# Patient Record
Sex: MALE | Race: OTHER | Employment: OTHER | URBAN - METROPOLITAN AREA
[De-identification: names, ages, dates, MRNs, and addresses within clinical notes are randomized per-mention and may not be internally consistent; named-entity substitution may affect disease eponyms.]

---

## 2017-01-21 ENCOUNTER — OFFICE VISIT (OUTPATIENT)
Dept: FAMILY MEDICINE CLINIC | Facility: CLINIC | Age: 42
End: 2017-01-21

## 2017-01-21 VITALS
WEIGHT: 237 LBS | DIASTOLIC BLOOD PRESSURE: 72 MMHG | TEMPERATURE: 98 F | SYSTOLIC BLOOD PRESSURE: 118 MMHG | HEIGHT: 71 IN | RESPIRATION RATE: 16 BRPM | BODY MASS INDEX: 33.18 KG/M2 | HEART RATE: 76 BPM

## 2017-01-21 DIAGNOSIS — Z00.8 TESTICULAR EXAM: ICD-10-CM

## 2017-01-21 DIAGNOSIS — R42 VERTIGO: ICD-10-CM

## 2017-01-21 DIAGNOSIS — Z71.3 DIETARY COUNSELING: ICD-10-CM

## 2017-01-21 DIAGNOSIS — Z13.31 DEPRESSION SCREEN: ICD-10-CM

## 2017-01-21 DIAGNOSIS — Z00.00 ROUTINE GENERAL MEDICAL EXAMINATION AT A HEALTH CARE FACILITY: Primary | ICD-10-CM

## 2017-01-21 DIAGNOSIS — Z71.82 EXERCISE COUNSELING: ICD-10-CM

## 2017-01-21 LAB
APPEARANCE: CLEAR
MULTISTIX LOT#: NORMAL NUMERIC
PH, URINE: 5 (ref 4.5–8)
SPECIFIC GRAVITY: 1.03 (ref 1–1.03)
URINE-COLOR: YELLOW
UROBILINOGEN,SEMI-QN: 0.2 MG/DL (ref 0–1.9)

## 2017-01-21 PROCEDURE — 99396 PREV VISIT EST AGE 40-64: CPT | Performed by: FAMILY MEDICINE

## 2017-01-21 NOTE — PROGRESS NOTES
Amelia Dickson. is a 37year old male who presents for a complete physical exam.   HPI:   Pt here for his annual CPE and testicular exam. He does not wish to have a prostate exam and has no rectal complaints.     He wanted me to know he had right knee pain Rfl:       Past Medical History   Diagnosis Date   • Hx of migraines    • Migraines    • Unspecified essential hypertension       No past surgical history on file.    Family History   Problem Relation Age of Onset   • Cancer Father 54     lung cancer-smoker BS's,no masses, HSM or tenderness  : two descended testes,no masses,no hernia,no penile lesions  RECTAL: deferred   MUSCULOSKELETAL: back is not tender,FROM of the back  EXTREMITIES: no cyanosis, clubbing or edema  NEURO: Oriented times three,cranial ner

## 2018-09-21 ENCOUNTER — OFFICE VISIT (OUTPATIENT)
Dept: FAMILY MEDICINE CLINIC | Facility: CLINIC | Age: 43
End: 2018-09-21
Payer: COMMERCIAL

## 2018-09-21 VITALS
DIASTOLIC BLOOD PRESSURE: 80 MMHG | HEART RATE: 72 BPM | TEMPERATURE: 98 F | RESPIRATION RATE: 16 BRPM | HEIGHT: 71 IN | SYSTOLIC BLOOD PRESSURE: 134 MMHG | BODY MASS INDEX: 31.92 KG/M2 | WEIGHT: 228 LBS

## 2018-09-21 DIAGNOSIS — M25.521 PAIN OF BOTH ELBOWS: ICD-10-CM

## 2018-09-21 DIAGNOSIS — Z00.00 LABORATORY EXAM ORDERED AS PART OF ROUTINE GENERAL MEDICAL EXAMINATION: ICD-10-CM

## 2018-09-21 DIAGNOSIS — Z23 NEED FOR INFLUENZA VACCINATION: ICD-10-CM

## 2018-09-21 DIAGNOSIS — Z00.00 ROUTINE MEDICAL EXAM: Primary | ICD-10-CM

## 2018-09-21 DIAGNOSIS — M25.522 PAIN OF BOTH ELBOWS: ICD-10-CM

## 2018-09-21 LAB
ALBUMIN/GLOBULIN RATIO: 1.7 (CALC) (ref 1–2.5)
ALBUMIN: 4.9 G/DL (ref 3.6–5.1)
ALKALINE PHOSPHATASE: 60 U/L (ref 40–115)
ALT: 70 U/L (ref 9–46)
AST: 44 U/L (ref 10–40)
BILIRUBIN, TOTAL: 0.9 MG/DL (ref 0.2–1.2)
BUN: 14 MG/DL (ref 7–25)
CALCIUM: 9.7 MG/DL (ref 8.6–10.3)
CARBON DIOXIDE: 29 MMOL/L (ref 20–32)
CHLORIDE: 101 MMOL/L (ref 98–110)
CHOL/HDLC RATIO: 5.1 (CALC)
CHOLESTEROL, TOTAL: 226 MG/DL
CREATININE: 0.98 MG/DL (ref 0.6–1.35)
EGFR IF AFRICN AM: 111 ML/MIN/1.73M2
EGFR IF NONAFRICN AM: 95 ML/MIN/1.73M2
GLOBULIN: 2.9 G/DL (CALC) (ref 1.9–3.7)
GLUCOSE: 100 MG/DL (ref 65–99)
HDL CHOLESTEROL: 44 MG/DL
LDL-CHOLESTEROL: 158 MG/DL (CALC)
NON-HDL CHOLESTEROL: 182 MG/DL (CALC)
POTASSIUM: 4 MMOL/L (ref 3.5–5.3)
PROTEIN, TOTAL: 7.8 G/DL (ref 6.1–8.1)
SODIUM: 138 MMOL/L (ref 135–146)
TRIGLYCERIDES: 120 MG/DL

## 2018-09-21 PROCEDURE — 99396 PREV VISIT EST AGE 40-64: CPT | Performed by: FAMILY MEDICINE

## 2018-09-21 PROCEDURE — 99213 OFFICE O/P EST LOW 20 MIN: CPT | Performed by: FAMILY MEDICINE

## 2018-09-21 PROCEDURE — 90471 IMMUNIZATION ADMIN: CPT | Performed by: FAMILY MEDICINE

## 2018-09-21 PROCEDURE — 90686 IIV4 VACC NO PRSV 0.5 ML IM: CPT | Performed by: FAMILY MEDICINE

## 2018-09-21 NOTE — PATIENT INSTRUCTIONS
Prevention Guidelines, Men Ages 36 to 52  Screening tests and vaccines are an important part of managing your health. A screening test is done to find possible disorders or diseases in people who don't have any symptoms.  The goal is to find a disease ear Syphilis Men at increased risk for infection – talk with your healthcare provider At routine exams   Tuberculosis Men at increased risk for infection – talk with your healthcare provider Check with your healthcare provider   Vision All men in this age [de-identified] Diet and exercise Men who are overweight or obese When diagnosed, and then at routine exams   Sexually transmitted infection prevention Men at increased risk for infection – talk with your healthcare provider At routine exams   Use of daily aspirin Men age

## 2018-09-21 NOTE — PROGRESS NOTES
Patient presents with:  Physical: Fasting for labs  Imm/Inj: Request Flu vaccine today. Elbow Pain: C/O bilateral elbow pain R>L. States 4-5 months ago he carried 2 boxes of tile up the stairs & pain since.  Rates pain 0/10 when sitting doing nothing, but Substance and Sexual Activity      Alcohol use: No        Comment: never a problem      Drug use: No      Sexual activity: Yes        Partners: Female    Other Topics      Concerns:         Service: Not Asked        Blood Transfusions: Not Asked nontender, nondistended. No hepatomegaly. MUSCULOSKELETAL: Strength of upper and lower extremities 5/5 bilaterally. Normal gait. Pain of the antecubital dottie with palpation, no tenderness of the olecranon process or lateral/medial epicondyles.   NEUROLOGIC

## 2018-09-24 PROBLEM — E78.2 MIXED HYPERLIPIDEMIA: Status: ACTIVE | Noted: 2018-01-01

## 2019-05-16 NOTE — PROGRESS NOTES
[facility] Group Visit Note  5/16/2019      Subjective:      Patient ID: Rico Cabrera. is a 44year old male.     Chief Complaint:  Patient presents with:  Bump: left chest      HPI:Finn Sheridan. is a 44year old male who is being seen today for t

## 2019-05-17 ENCOUNTER — OFFICE VISIT (OUTPATIENT)
Dept: FAMILY MEDICINE CLINIC | Facility: CLINIC | Age: 44
End: 2019-05-17
Payer: COMMERCIAL

## 2019-05-17 VITALS
TEMPERATURE: 98 F | DIASTOLIC BLOOD PRESSURE: 82 MMHG | RESPIRATION RATE: 16 BRPM | BODY MASS INDEX: 32.62 KG/M2 | HEIGHT: 71 IN | HEART RATE: 74 BPM | WEIGHT: 233 LBS | SYSTOLIC BLOOD PRESSURE: 122 MMHG

## 2019-05-17 DIAGNOSIS — D17.1 LIPOMA OF TORSO: Primary | ICD-10-CM

## 2019-05-17 DIAGNOSIS — E78.2 MIXED HYPERLIPIDEMIA: ICD-10-CM

## 2019-05-17 DIAGNOSIS — Z00.00 LABORATORY EXAM ORDERED AS PART OF ROUTINE GENERAL MEDICAL EXAMINATION: ICD-10-CM

## 2019-05-17 PROCEDURE — 99213 OFFICE O/P EST LOW 20 MIN: CPT | Performed by: FAMILY MEDICINE

## 2019-05-17 NOTE — PATIENT INSTRUCTIONS
Lipoma, No Treatment  A lipoma is a local overgrowth of fatty tissue. It appears as a soft raised area, usually less than 2 inches across. It is a benign condition (not cancer).   Home care  General information regarding lipoma includes:  1.  No special c

## 2019-11-22 ENCOUNTER — OFFICE VISIT (OUTPATIENT)
Dept: FAMILY MEDICINE CLINIC | Facility: CLINIC | Age: 44
End: 2019-11-22
Payer: COMMERCIAL

## 2019-11-22 VITALS
RESPIRATION RATE: 16 BRPM | SYSTOLIC BLOOD PRESSURE: 126 MMHG | BODY MASS INDEX: 34.02 KG/M2 | HEIGHT: 71 IN | HEART RATE: 84 BPM | DIASTOLIC BLOOD PRESSURE: 82 MMHG | WEIGHT: 243 LBS | TEMPERATURE: 98 F

## 2019-11-22 DIAGNOSIS — R19.07 GENERALIZED SWELLING, MASS, OR LUMP OF ABDOMEN OR PELVIS: Primary | ICD-10-CM

## 2019-11-22 PROCEDURE — 99213 OFFICE O/P EST LOW 20 MIN: CPT | Performed by: FAMILY MEDICINE

## 2019-11-22 NOTE — PROGRESS NOTES
[facility] Group Visit Note  11/22/2019      Subjective:      Patient ID: Noam Bunn. is a 38year old male. Chief Complaint:  Patient presents with: Follow - Up: Here to discuss getting a referral for the Lipoma on the left torso.  States for

## 2019-12-02 ENCOUNTER — TELEPHONE (OUTPATIENT)
Dept: FAMILY MEDICINE CLINIC | Facility: CLINIC | Age: 44
End: 2019-12-02

## 2019-12-02 NOTE — TELEPHONE ENCOUNTER
Patient states he went to [facility] to have his CT and was turned away without an order. He states he tried contacting the doctor, [employer], the office, the answering service to try to obtain it.  He states he did not drink the contrast an

## 2019-12-09 ENCOUNTER — TELEPHONE (OUTPATIENT)
Dept: FAMILY MEDICINE CLINIC | Facility: CLINIC | Age: 44
End: 2019-12-09

## 2019-12-09 DIAGNOSIS — Q67.8 CHEST WALL ASYMMETRY: Primary | ICD-10-CM

## 2019-12-09 NOTE — TELEPHONE ENCOUNTER
CT Abdomen with contract done on 12/6/19 @ [facility] results/report received via fax. Report placed in Dr. Josemanuel Galaviz in box for review.

## 2019-12-11 NOTE — TELEPHONE ENCOUNTER
Please inform pt his CT abd showed some incidental findings:  -fatty liver. Tx is diet/exercise.   -gall stones, which would cause pain of the RUQ after eating fatty foods, which is not what he was c/o.     But overall no finding of the abdominal wall over

## 2019-12-11 NOTE — TELEPHONE ENCOUNTER
[facility] requesting a return all. Sent report to scan, copy in Triage file.

## 2019-12-12 NOTE — TELEPHONE ENCOUNTER
Pt informed of both test results and recommendations, answered questions and he expressed understanding and agreement. He will call to schedule the Xray. Task completed.

## 2020-05-22 ENCOUNTER — APPOINTMENT (OUTPATIENT)
Dept: CT IMAGING | Facility: HOSPITAL | Age: 45
End: 2020-05-22
Attending: PHYSICIAN ASSISTANT
Payer: COMMERCIAL

## 2020-05-22 ENCOUNTER — HOSPITAL ENCOUNTER (EMERGENCY)
Facility: HOSPITAL | Age: 45
Discharge: HOME | End: 2020-05-22
Attending: EMERGENCY MEDICINE
Payer: COMMERCIAL

## 2020-05-22 ENCOUNTER — TELEPHONE (OUTPATIENT)
Dept: FAMILY MEDICINE CLINIC | Facility: CLINIC | Age: 45
End: 2020-05-22

## 2020-05-22 ENCOUNTER — VIRTUAL PHONE E/M (OUTPATIENT)
Dept: FAMILY MEDICINE CLINIC | Facility: CLINIC | Age: 45
End: 2020-05-22
Payer: COMMERCIAL

## 2020-05-22 VITALS
BODY MASS INDEX: 33 KG/M2 | OXYGEN SATURATION: 99 % | TEMPERATURE: 97 F | DIASTOLIC BLOOD PRESSURE: 90 MMHG | SYSTOLIC BLOOD PRESSURE: 169 MMHG | HEART RATE: 83 BPM | WEIGHT: 240 LBS | RESPIRATION RATE: 24 BRPM

## 2020-05-22 DIAGNOSIS — N13.2 HYDRONEPHROSIS CONCURRENT WITH AND DUE TO CALCULI OF KIDNEY AND URETER: ICD-10-CM

## 2020-05-22 DIAGNOSIS — R73.9 HYPERGLYCEMIA: Primary | ICD-10-CM

## 2020-05-22 DIAGNOSIS — R11.0 NAUSEA: ICD-10-CM

## 2020-05-22 DIAGNOSIS — R10.31 RLQ ABDOMINAL PAIN: Primary | ICD-10-CM

## 2020-05-22 PROCEDURE — 80053 COMPREHEN METABOLIC PANEL: CPT | Performed by: PHYSICIAN ASSISTANT

## 2020-05-22 PROCEDURE — 96376 TX/PRO/DX INJ SAME DRUG ADON: CPT

## 2020-05-22 PROCEDURE — 99284 EMERGENCY DEPT VISIT MOD MDM: CPT

## 2020-05-22 PROCEDURE — 85025 COMPLETE CBC W/AUTO DIFF WBC: CPT | Performed by: PHYSICIAN ASSISTANT

## 2020-05-22 PROCEDURE — 81001 URINALYSIS AUTO W/SCOPE: CPT | Performed by: EMERGENCY MEDICINE

## 2020-05-22 PROCEDURE — 99441 PHONE E/M BY PHYS 5-10 MIN: CPT | Performed by: FAMILY MEDICINE

## 2020-05-22 PROCEDURE — 96361 HYDRATE IV INFUSION ADD-ON: CPT

## 2020-05-22 PROCEDURE — 74176 CT ABD & PELVIS W/O CONTRAST: CPT | Performed by: PHYSICIAN ASSISTANT

## 2020-05-22 PROCEDURE — 96374 THER/PROPH/DIAG INJ IV PUSH: CPT

## 2020-05-22 PROCEDURE — 96375 TX/PRO/DX INJ NEW DRUG ADDON: CPT

## 2020-05-22 RX ORDER — HYDROMORPHONE HYDROCHLORIDE 1 MG/ML
0.5 INJECTION, SOLUTION INTRAMUSCULAR; INTRAVENOUS; SUBCUTANEOUS ONCE
Status: COMPLETED | OUTPATIENT
Start: 2020-05-22 | End: 2020-05-22

## 2020-05-22 RX ORDER — TAMSULOSIN HYDROCHLORIDE 0.4 MG/1
0.4 CAPSULE ORAL DAILY
Qty: 7 CAPSULE | Refills: 0 | Status: SHIPPED | OUTPATIENT
Start: 2020-05-22 | End: 2020-05-29

## 2020-05-22 RX ORDER — HYDROCODONE BITARTRATE AND ACETAMINOPHEN 5; 325 MG/1; MG/1
1-2 TABLET ORAL EVERY 6 HOURS PRN
Qty: 10 TABLET | Refills: 0 | Status: SHIPPED | OUTPATIENT
Start: 2020-05-22 | End: 2020-05-29

## 2020-05-22 RX ORDER — TAMSULOSIN HYDROCHLORIDE 0.4 MG/1
0.4 CAPSULE ORAL DAILY
Qty: 7 CAPSULE | Refills: 0 | Status: SHIPPED | OUTPATIENT
Start: 2020-05-22 | End: 2020-05-22

## 2020-05-22 RX ORDER — KETOROLAC TROMETHAMINE 30 MG/ML
15 INJECTION, SOLUTION INTRAMUSCULAR; INTRAVENOUS ONCE
Status: COMPLETED | OUTPATIENT
Start: 2020-05-22 | End: 2020-05-22

## 2020-05-22 RX ORDER — HYDROMORPHONE HYDROCHLORIDE 1 MG/ML
1 INJECTION, SOLUTION INTRAMUSCULAR; INTRAVENOUS; SUBCUTANEOUS ONCE
Status: COMPLETED | OUTPATIENT
Start: 2020-05-22 | End: 2020-05-22

## 2020-05-22 NOTE — TELEPHONE ENCOUNTER
Please call [phone]    Virtual/Telephone Consent    Colleen Sears. verbally consents to a Virtual/Telephone Check-In service on 5/22/20.   Patient understands and accepts financial responsibility for any deductible, co-insurance and/or co-pays associ

## 2020-05-22 NOTE — ED PROVIDER NOTES
Patient Seen in: [facility] Emergency Department      History   Patient presents with:  Abdomen/Flank Pain    Stated Complaint: abd pain    HPI    21-year-old male here with complaint of right flank pain after he had a BM this morning.   Patient repo Mouth: Mucous membranes are moist.   Eyes:      Extraocular Movements: Extraocular movements intact. Conjunctiva/sclera: Conjunctivae normal.      Pupils: Pupils are equal, round, and reactive to light.    Neck:      Musculoskeletal: Normal range Please view results for these tests on the individual orders.    RAINBOW DRAW BLUE   RAINBOW DRAW LAVENDER   RAINBOW DRAW LIGHT GREEN   RAINBOW DRAW GOLD   CBC W/ DIFFERENTIAL   Ct Abdomen+pelvis Kidneystone 2d Rndr(no Iv,no Oral)(cpt=74176)    Result D the bowel is decompressed, limiting assessment. There is a normal-appearing appendix. ABDOMINAL WALL:  No mass or hernia. BONES:  No bony lesion or fracture. PELVIC ORGANS:  Normal for age. LUNG BASES:  No visible pulmonary or pleural disease.   OTHER: Medication List    START taking these medications    HYDROcodone-acetaminophen 5-325 MG Oral Tab  Take 1-2 tablets by mouth every 6 (six) hours as needed for Pain.   Qty: 10 tablet Refills: 0    tamsulosin (FLOMAX) cap  Take 1 capsule (0.4 mg total) by mout

## 2020-05-22 NOTE — PROGRESS NOTES
Virtual Telephone Check-In    [address] [place] [address]. erbally consents to a Virtual/Telephone Check-In visit on  05/22/20. Patient understands and accepts financial responsibility for any deductible, co-insurance and/or co-pays associated with this service.

## 2022-10-14 ENCOUNTER — OFFICE VISIT (OUTPATIENT)
Dept: FAMILY MEDICINE CLINIC | Facility: CLINIC | Age: 47
End: 2022-10-14
Payer: COMMERCIAL

## 2022-10-14 VITALS
RESPIRATION RATE: 16 BRPM | SYSTOLIC BLOOD PRESSURE: 180 MMHG | OXYGEN SATURATION: 99 % | TEMPERATURE: 98 F | HEART RATE: 96 BPM | DIASTOLIC BLOOD PRESSURE: 100 MMHG | BODY MASS INDEX: 33.32 KG/M2 | WEIGHT: 238 LBS | HEIGHT: 71 IN

## 2022-10-14 DIAGNOSIS — I10 ESSENTIAL HYPERTENSION: Primary | ICD-10-CM

## 2022-10-14 DIAGNOSIS — G43.909 MIGRAINE WITHOUT STATUS MIGRAINOSUS, NOT INTRACTABLE, UNSPECIFIED MIGRAINE TYPE: ICD-10-CM

## 2022-10-14 DIAGNOSIS — E78.2 MIXED HYPERLIPIDEMIA: ICD-10-CM

## 2022-10-14 DIAGNOSIS — R73.09 ELEVATED GLUCOSE: ICD-10-CM

## 2022-10-14 PROCEDURE — 3008F BODY MASS INDEX DOCD: CPT | Performed by: FAMILY MEDICINE

## 2022-10-14 PROCEDURE — 3080F DIAST BP >= 90 MM HG: CPT | Performed by: FAMILY MEDICINE

## 2022-10-14 PROCEDURE — 99214 OFFICE O/P EST MOD 30 MIN: CPT | Performed by: FAMILY MEDICINE

## 2022-10-14 PROCEDURE — 3077F SYST BP >= 140 MM HG: CPT | Performed by: FAMILY MEDICINE

## 2022-10-14 RX ORDER — METOPROLOL SUCCINATE 25 MG/1
25 TABLET, EXTENDED RELEASE ORAL DAILY
Qty: 30 TABLET | Refills: 0 | Status: SHIPPED | OUTPATIENT
Start: 2022-10-14

## 2022-10-15 LAB
ABSOLUTE BASOPHILS: 39 CELLS/UL (ref 0–200)
ABSOLUTE EOSINOPHILS: 101 CELLS/UL (ref 15–500)
ABSOLUTE LYMPHOCYTES: 2278 CELLS/UL (ref 850–3900)
ABSOLUTE MONOCYTES: 530 CELLS/UL (ref 200–950)
ABSOLUTE NEUTROPHILS: 4852 CELLS/UL (ref 1500–7800)
ALBUMIN/GLOBULIN RATIO: 1.5 (CALC) (ref 1–2.5)
ALBUMIN: 5.1 G/DL (ref 3.6–5.1)
ALKALINE PHOSPHATASE: 78 U/L (ref 36–130)
ALT: 62 U/L (ref 9–46)
AST: 48 U/L (ref 10–40)
BASOPHILS: 0.5 %
BILIRUBIN, TOTAL: 0.6 MG/DL (ref 0.2–1.2)
BUN: 13 MG/DL (ref 7–25)
CALCIUM: 10.4 MG/DL (ref 8.6–10.3)
CARBON DIOXIDE: 30 MMOL/L (ref 20–32)
CHLORIDE: 98 MMOL/L (ref 98–110)
CHOL/HDLC RATIO: 5.6 (CALC)
CHOLESTEROL, TOTAL: 247 MG/DL
CREATININE: 0.84 MG/DL (ref 0.6–1.29)
EGFR: 110 ML/MIN/1.73M2
EOSINOPHILS: 1.3 %
GLOBULIN: 3.3 G/DL (CALC) (ref 1.9–3.7)
GLUCOSE: 222 MG/DL (ref 65–99)
HDL CHOLESTEROL: 44 MG/DL
HEMATOCRIT: 46.7 % (ref 38.5–50)
HEMOGLOBIN A1C: 9.5 % OF TOTAL HGB
HEMOGLOBIN: 16.2 G/DL (ref 13.2–17.1)
LYMPHOCYTES: 29.2 %
MCH: 31.6 PG (ref 27–33)
MCHC: 34.7 G/DL (ref 32–36)
MCV: 91.2 FL (ref 80–100)
MONOCYTES: 6.8 %
MPV: 11.2 FL (ref 7.5–12.5)
NEUTROPHILS: 62.2 %
NON-HDL CHOLESTEROL: 203 MG/DL (CALC)
PLATELET COUNT: 214 THOUSAND/UL (ref 140–400)
POTASSIUM: 4.3 MMOL/L (ref 3.5–5.3)
PROTEIN, TOTAL: 8.4 G/DL (ref 6.1–8.1)
RDW: 12.2 % (ref 11–15)
RED BLOOD CELL COUNT: 5.12 MILLION/UL (ref 4.2–5.8)
SODIUM: 137 MMOL/L (ref 135–146)
TRIGLYCERIDES: 499 MG/DL
TSH: 1.89 MIU/L (ref 0.4–4.5)
WHITE BLOOD CELL COUNT: 7.8 THOUSAND/UL (ref 3.8–10.8)

## 2022-10-16 ENCOUNTER — TELEPHONE (OUTPATIENT)
Dept: FAMILY MEDICINE CLINIC | Facility: CLINIC | Age: 47
End: 2022-10-16

## 2022-10-17 NOTE — TELEPHONE ENCOUNTER
On-call: BP still high and wondering if needs to go to ER. Has taken 2 doses of metoprolol tartrate 25mg and not much difference, Had a headache yesterday, but not today. Feels fine. 175/107, 180/100, 173/94    Denies- chest pain, sob, nosebleeds, headache      Rec- increase metoprolol succinate 25mg up to 2# nightly. Take it at [facility], [facility]. If not reducing to <160/<100 to call the office on Mon or Tues and will further adjust. Has an appt for this Friday. If any concerning sxs occur to call the office/go to ER.  Pt agrees to plan

## 2022-10-21 ENCOUNTER — TELEMEDICINE (OUTPATIENT)
Dept: FAMILY MEDICINE CLINIC | Facility: CLINIC | Age: 47
End: 2022-10-21

## 2022-10-21 DIAGNOSIS — R73.09 ELEVATED GLUCOSE: Primary | ICD-10-CM

## 2022-10-21 DIAGNOSIS — I10 ESSENTIAL HYPERTENSION: ICD-10-CM

## 2022-10-21 DIAGNOSIS — R74.8 ELEVATED LIVER ENZYMES: ICD-10-CM

## 2022-10-21 PROCEDURE — 99214 OFFICE O/P EST MOD 30 MIN: CPT | Performed by: FAMILY MEDICINE

## 2022-10-21 RX ORDER — METOPROLOL SUCCINATE 50 MG/1
50 TABLET, EXTENDED RELEASE ORAL DAILY
Qty: 90 TABLET | Refills: 0 | Status: SHIPPED | OUTPATIENT
Start: 2022-10-21

## 2022-10-31 ENCOUNTER — TELEPHONE (OUTPATIENT)
Dept: FAMILY MEDICINE CLINIC | Facility: CLINIC | Age: 47
End: 2022-10-31

## 2022-10-31 ENCOUNTER — PATIENT MESSAGE (OUTPATIENT)
Dept: FAMILY MEDICINE CLINIC | Facility: CLINIC | Age: 47
End: 2022-10-31

## 2022-10-31 NOTE — TELEPHONE ENCOUNTER
From: [employer]. To: Jamia Finley MD  Sent: 10/31/2022 8:53 AM CDT  Subject: Medication question     Hello I was wondering if at all possible can my blood pressure medication be lowered to 25 mg as opposed to the 50 mg. At 50 mg I have noticed increase dizziness.

## 2023-01-03 DIAGNOSIS — I10 ESSENTIAL HYPERTENSION: ICD-10-CM

## 2023-01-04 RX ORDER — METOPROLOL SUCCINATE 50 MG/1
50 TABLET, EXTENDED RELEASE ORAL DAILY
Qty: 90 TABLET | Refills: 0 | Status: SHIPPED | OUTPATIENT
Start: 2023-01-04

## 2023-01-27 ENCOUNTER — OFFICE VISIT (OUTPATIENT)
Dept: FAMILY MEDICINE CLINIC | Facility: CLINIC | Age: 48
End: 2023-01-27
Payer: COMMERCIAL

## 2023-01-27 ENCOUNTER — TELEPHONE (OUTPATIENT)
Dept: FAMILY MEDICINE CLINIC | Facility: CLINIC | Age: 48
End: 2023-01-27

## 2023-01-27 VITALS
OXYGEN SATURATION: 99 % | TEMPERATURE: 97 F | HEART RATE: 87 BPM | HEIGHT: 71 IN | DIASTOLIC BLOOD PRESSURE: 74 MMHG | WEIGHT: 207.25 LBS | SYSTOLIC BLOOD PRESSURE: 136 MMHG | BODY MASS INDEX: 29.02 KG/M2 | RESPIRATION RATE: 16 BRPM

## 2023-01-27 DIAGNOSIS — Z00.00 ROUTINE MEDICAL EXAM: Primary | ICD-10-CM

## 2023-01-27 DIAGNOSIS — R73.09 ELEVATED GLUCOSE: ICD-10-CM

## 2023-01-27 DIAGNOSIS — Z00.00 LABORATORY EXAM ORDERED AS PART OF ROUTINE GENERAL MEDICAL EXAMINATION: ICD-10-CM

## 2023-01-27 DIAGNOSIS — R74.8 ELEVATED LIVER ENZYMES: ICD-10-CM

## 2023-01-27 DIAGNOSIS — E78.1 HYPERTRIGLYCERIDEMIA: ICD-10-CM

## 2023-01-27 DIAGNOSIS — Z12.5 PROSTATE CANCER SCREENING: ICD-10-CM

## 2023-01-27 DIAGNOSIS — Z12.11 COLON CANCER SCREENING: ICD-10-CM

## 2023-01-27 DIAGNOSIS — Z23 NEED FOR VACCINATION: ICD-10-CM

## 2023-01-27 LAB
CERULOPLASMIN: 33 MG/DL (ref 18–36)
FERRITIN: 64 NG/ML (ref 38–380)
HEMOGLOBIN A1C: 5.7 % OF TOTAL HGB
MITOCHONDRIAL AB SCREEN: NEGATIVE
SIGNAL TO CUT-OFF: 0.07
TISSUE TRANSGLUTAMINASE$ANTIBODY, IGA: <1 U/ML

## 2023-01-27 PROCEDURE — 90472 IMMUNIZATION ADMIN EACH ADD: CPT | Performed by: FAMILY MEDICINE

## 2023-01-27 PROCEDURE — 90714 TD VACC NO PRESV 7 YRS+ IM: CPT | Performed by: FAMILY MEDICINE

## 2023-01-27 PROCEDURE — 3078F DIAST BP <80 MM HG: CPT | Performed by: FAMILY MEDICINE

## 2023-01-27 PROCEDURE — 3008F BODY MASS INDEX DOCD: CPT | Performed by: FAMILY MEDICINE

## 2023-01-27 PROCEDURE — 99396 PREV VISIT EST AGE 40-64: CPT | Performed by: FAMILY MEDICINE

## 2023-01-27 PROCEDURE — 90471 IMMUNIZATION ADMIN: CPT | Performed by: FAMILY MEDICINE

## 2023-01-27 PROCEDURE — 3075F SYST BP GE 130 - 139MM HG: CPT | Performed by: FAMILY MEDICINE

## 2023-01-27 NOTE — TELEPHONE ENCOUNTER
Pt had labs done at [address] [place] [address] on wed 1/25. I placed more labs today, can you call and see if they can add them?

## 2023-02-04 ENCOUNTER — PATIENT MESSAGE (OUTPATIENT)
Dept: FAMILY MEDICINE CLINIC | Facility: CLINIC | Age: 48
End: 2023-02-04

## 2023-02-04 LAB
ACTIN (SMOOTH MUSCLE)$ANTIBODY (IGG): <20 U
ALBUMIN/GLOBULIN RATIO: 1.7 (CALC) (ref 1–2.5)
ALBUMIN: 5.5 G/DL (ref 3.6–5.1)
ALKALINE PHOSPHATASE: 81 U/L (ref 36–130)
ALPHA-1-ANTITRYPSIN QN: 150 MG/DL (ref 83–199)
ALT: 18 U/L (ref 9–46)
ANA SCREEN, IFA: POSITIVE
AST: 17 U/L (ref 10–40)
BILIRUBIN, TOTAL: 0.5 MG/DL (ref 0.2–1.2)
BUN: 20 MG/DL (ref 7–25)
CALCIUM: 11.1 MG/DL (ref 8.6–10.3)
CARBON DIOXIDE: 23 MMOL/L (ref 20–32)
CERULOPLASMIN: 33 MG/DL (ref 18–36)
CHLORIDE: 102 MMOL/L (ref 98–110)
CHOL/HDLC RATIO: 5.2 (CALC)
CHOLESTEROL, TOTAL: 245 MG/DL
CREATININE: 0.96 MG/DL (ref 0.6–1.29)
DNA (DS) ANTIBODY: 1 IU/ML
EGFR: 99 ML/MIN/1.73M2
FERRITIN: 64 NG/ML (ref 38–380)
GLOBULIN: 3.3 G/DL (CALC) (ref 1.9–3.7)
GLUCOSE: 136 MG/DL (ref 65–99)
HDL CHOLESTEROL: 47 MG/DL
HEMOGLOBIN A1C: 5.7 % OF TOTAL HGB
IMMUNOGLOBULIN A: 301 MG/DL (ref 47–310)
LDL-CHOLESTEROL: 168 MG/DL (CALC)
MITOCHONDRIAL AB SCREEN: NEGATIVE
NON-HDL CHOLESTEROL: 198 MG/DL (CALC)
POTASSIUM: 4.1 MMOL/L (ref 3.5–5.3)
PROTEIN, TOTAL: 8.8 G/DL (ref 6.1–8.1)
SIGNAL TO CUT-OFF: 0.07
SODIUM: 140 MMOL/L (ref 135–146)
TISSUE TRANSGLUTAMINASE$ANTIBODY, IGA: <1 U/ML
TRIGLYCERIDES: 152 MG/DL

## 2023-02-10 ENCOUNTER — TELEPHONE (OUTPATIENT)
Dept: FAMILY MEDICINE CLINIC | Facility: CLINIC | Age: 48
End: 2023-02-10

## 2023-02-10 PROBLEM — R76.8 POSITIVE ANA (ANTINUCLEAR ANTIBODY): Status: ACTIVE | Noted: 2023-02-01

## 2023-02-10 NOTE — TELEPHONE ENCOUNTER
Dr.Martens- Smith's wife is calling to request a call when lab results are available.     Heidi 30. [phone]

## 2023-02-10 NOTE — TELEPHONE ENCOUNTER
See phone message below:    Labs added to 1/25/23 [facility] labs are ready for review. Thanks! Detail Level: Zone

## 2023-02-13 DIAGNOSIS — R79.89 ELEVATED LIVER FUNCTION TESTS: Primary | ICD-10-CM

## 2023-02-13 DIAGNOSIS — R76.8 POSITIVE ANA (ANTINUCLEAR ANTIBODY): ICD-10-CM

## 2023-02-24 ENCOUNTER — TELEPHONE (OUTPATIENT)
Dept: ORTHOPEDICS CLINIC | Facility: CLINIC | Age: 48
End: 2023-02-24

## 2023-02-24 DIAGNOSIS — M25.561 RIGHT KNEE PAIN, UNSPECIFIED CHRONICITY: Primary | ICD-10-CM

## 2023-02-24 NOTE — TELEPHONE ENCOUNTER
NP Right Knee Pain. Please advise if imaging is needed.   Future Appointments   Date Time Provider Juliann Domi   3/29/2023  8:40 AM Jeannine Newby MD [facility]   4/20/2023  1:30 PM Luz Maria Vaughn MD [facility] [address]

## 2023-02-24 NOTE — TELEPHONE ENCOUNTER
XR ordered and scheduled per Ortho protocol. Sent patient message via [employer] to inform them and ask them to arrive 15-20 minutes prior to appointment with Shahana Urena.

## 2023-03-22 LAB — AMB EXT COLOGUARD RESULT: NEGATIVE

## 2023-03-30 ENCOUNTER — TELEPHONE (OUTPATIENT)
Dept: FAMILY MEDICINE CLINIC | Facility: CLINIC | Age: 48
End: 2023-03-30

## 2023-03-30 NOTE — TELEPHONE ENCOUNTER
We received Cologuard test results. They are negative. Report has been abstracted. [facility] message sent to patient. HM updated.

## 2023-04-03 ENCOUNTER — PATIENT MESSAGE (OUTPATIENT)
Dept: FAMILY MEDICINE CLINIC | Facility: CLINIC | Age: 48
End: 2023-04-03

## 2023-04-03 DIAGNOSIS — Z01.818 PRE-OPERATIVE EXAMINATION: ICD-10-CM

## 2023-04-03 DIAGNOSIS — S83.241D ACUTE MEDIAL MENISCUS TEAR OF RIGHT KNEE, SUBSEQUENT ENCOUNTER: Primary | ICD-10-CM

## 2023-04-03 NOTE — TELEPHONE ENCOUNTER
From: [employer]. To: Nidia Meckel, MD  Sent: 4/3/2023 10:49 AM CDT  Subject: Pre op test     Hey there I am scheduled to have knee surgery on May 10th and need lab work and orders complete prior  Dr. Oscar Calles will be surgeon and was told they would be sending the orders to Dr Cathy Beckwith. Just wonder if that has happened yet?

## 2023-04-03 NOTE — TELEPHONE ENCOUNTER
preop appt scheduled for 5/2/23  Future Appointments   Date Time Provider Juliann Moyai   4/20/2023  1:30 PM Norma Shaikh MD [facility]   5/2/2023  7:40 AM Fidencio Evans MD [facility] [address]     Were orders received?

## 2023-04-11 ENCOUNTER — TELEPHONE (OUTPATIENT)
Dept: ORTHOPEDICS CLINIC | Facility: CLINIC | Age: 48
End: 2023-04-11

## 2023-04-11 DIAGNOSIS — M79.671 RIGHT FOOT PAIN: Primary | ICD-10-CM

## 2023-04-11 NOTE — TELEPHONE ENCOUNTER
Patient is scheduled with Dr. Rafael Salinas for right foot pain. Please advise if imaging is needed.

## 2023-04-11 NOTE — TELEPHONE ENCOUNTER
Patient aware to arrive early for xray:  Future Appointments   Date Time Provider Juliann Moyai   4/20/2023  1:30 PM Kisha Leblanc MD [facility]   4/25/2023  2:45 PM NAP XR RM1 [facility] XRAY [facility] [place]   4/25/2023  3:00 PM Florencio Rankin DPM [facility]   5/8/2023  4:20 PM Jason Scott MD [facility]

## 2023-04-20 ENCOUNTER — OFFICE VISIT (OUTPATIENT)
Dept: RHEUMATOLOGY | Facility: CLINIC | Age: 48
End: 2023-04-20
Payer: COMMERCIAL

## 2023-04-20 VITALS
BODY MASS INDEX: 29.54 KG/M2 | HEART RATE: 85 BPM | OXYGEN SATURATION: 97 % | TEMPERATURE: 98 F | HEIGHT: 71 IN | SYSTOLIC BLOOD PRESSURE: 134 MMHG | WEIGHT: 211 LBS | DIASTOLIC BLOOD PRESSURE: 76 MMHG

## 2023-04-20 DIAGNOSIS — R76.8 POSITIVE ANA (ANTINUCLEAR ANTIBODY): Primary | ICD-10-CM

## 2023-04-20 DIAGNOSIS — R74.01 ELEVATED ALT MEASUREMENT: ICD-10-CM

## 2023-04-20 DIAGNOSIS — M77.51 BURSITIS OF RIGHT FOOT: ICD-10-CM

## 2023-04-20 DIAGNOSIS — E83.52 HYPERCALCEMIA: ICD-10-CM

## 2023-04-20 DIAGNOSIS — Z51.81 THERAPEUTIC DRUG MONITORING: ICD-10-CM

## 2023-04-20 DIAGNOSIS — K76.0 NAFLD (NONALCOHOLIC FATTY LIVER DISEASE): ICD-10-CM

## 2023-04-20 PROCEDURE — 99204 OFFICE O/P NEW MOD 45 MIN: CPT | Performed by: INTERNAL MEDICINE

## 2023-04-20 PROCEDURE — 3008F BODY MASS INDEX DOCD: CPT | Performed by: INTERNAL MEDICINE

## 2023-04-20 PROCEDURE — 3075F SYST BP GE 130 - 139MM HG: CPT | Performed by: INTERNAL MEDICINE

## 2023-04-20 PROCEDURE — 3078F DIAST BP <80 MM HG: CPT | Performed by: INTERNAL MEDICINE

## 2023-05-08 ENCOUNTER — OFFICE VISIT (OUTPATIENT)
Dept: ORTHOPEDICS CLINIC | Facility: CLINIC | Age: 48
End: 2023-05-08
Payer: COMMERCIAL

## 2023-05-08 ENCOUNTER — TELEPHONE (OUTPATIENT)
Dept: ORTHOPEDICS CLINIC | Facility: CLINIC | Age: 48
End: 2023-05-08

## 2023-05-08 VITALS — OXYGEN SATURATION: 99 % | HEIGHT: 71.5 IN | BODY MASS INDEX: 29.63 KG/M2 | HEART RATE: 72 BPM | WEIGHT: 216.38 LBS

## 2023-05-08 DIAGNOSIS — S83.241A ACUTE MEDIAL MENISCUS TEAR, RIGHT, INITIAL ENCOUNTER: Primary | ICD-10-CM

## 2023-05-08 PROCEDURE — 99203 OFFICE O/P NEW LOW 30 MIN: CPT | Performed by: ORTHOPAEDIC SURGERY

## 2023-05-08 PROCEDURE — 3008F BODY MASS INDEX DOCD: CPT | Performed by: ORTHOPAEDIC SURGERY

## 2023-05-08 NOTE — TELEPHONE ENCOUNTER
Patient calling due to xrays that have been ordered and scheduled. Patients wife is saying he had xray of right knee done 3/2 and would like to request if he is able to bring imaging disk due to not wanting to pay for additional xr. Patient stated he will bring disk either way, please advise if this is ok as patient would like to follow up.

## 2023-05-24 ENCOUNTER — OFFICE VISIT (OUTPATIENT)
Dept: ORTHOPEDICS CLINIC | Facility: CLINIC | Age: 48
End: 2023-05-24
Payer: COMMERCIAL

## 2023-05-24 VITALS — HEIGHT: 72 IN | WEIGHT: 205 LBS | BODY MASS INDEX: 27.77 KG/M2

## 2023-05-24 DIAGNOSIS — S83.231A COMPLEX TEAR OF MEDIAL MENISCUS OF RIGHT KNEE AS CURRENT INJURY, INITIAL ENCOUNTER: Primary | ICD-10-CM

## 2023-05-24 RX ORDER — TRIAMCINOLONE ACETONIDE 40 MG/ML
40 INJECTION, SUSPENSION INTRA-ARTICULAR; INTRAMUSCULAR ONCE
Status: COMPLETED | OUTPATIENT
Start: 2023-05-24 | End: 2023-05-24

## 2023-05-24 RX ORDER — KETOROLAC TROMETHAMINE 30 MG/ML
30 INJECTION, SOLUTION INTRAMUSCULAR; INTRAVENOUS ONCE
Status: COMPLETED | OUTPATIENT
Start: 2023-05-24 | End: 2023-05-24

## 2023-05-24 RX ADMIN — TRIAMCINOLONE ACETONIDE 40 MG: 40 INJECTION, SUSPENSION INTRA-ARTICULAR; INTRAMUSCULAR at 08:01:00

## 2023-05-24 RX ADMIN — KETOROLAC TROMETHAMINE 30 MG: 30 INJECTION, SOLUTION INTRAMUSCULAR; INTRAVENOUS at 08:01:00

## 2023-05-24 NOTE — PROCEDURES
Right Knee Intra-articular Injection    Name: Jake Rocha   MRN: [id number]  Date: 5/24/2023     Clinical Indications:   Meniscus Tear with symptoms refractory to conservative measures. After informed consent, the injection site was marked, sterilized with topical chlorhexidine antiseptic, and locally anesthetized with skin refrigerant. The patient was situation in a comfortable position. Using sterile technique: 1 mL of 30mg/mL of Ketorolac, 2 mL of 0.5% Bupivicaine, 2 mL of 1% Lidocaine, and 1 mL of 40 mg/ml Triamcinolone was injected utilizing anterolateral approach with a 22 gauge needle. A band-aid was applied. The patient tolerated the procedure well. Brown Sood. Kasandra Wood MD  Knee, Shoulder, & Elbow Surgery / Sports Medicine Specialist  THE [facility] Orthopaedic Surgery  [address]   Katerine Anand. [email or web address]  t: [phone]  o: [phone]  f: [phone]

## 2023-09-13 NOTE — PATIENT INSTRUCTIONS
Continue to focus on a healthy diet and exercise.  Fasting labs today.  Continue to follow with GYN.   Finn you were seen today for your annual  physical. Please continue healthy habits with your diet and exercise. Wear sunscreen as needed and insect repellant when needed. Labs are due by November of 2017. Get me copies of your life insurance labs.   See a · Cut the sugar in recipes by 1/3 to 1/2. Boost the flavor with extracts like almond, vanilla, or orange. Or add spices such as cinnamon or nutmeg. 4. Eat more fiber  · Eat fresh fruits and vegetables every day. · Boost your diet with whole grains.  Go fo Prostate cancer Starting at age 39, talk to healthcare provider about risks and benefits of digital rectal exam (PAT) and prostate-specific antigen (PSA) screening1 At routine exams   Syphilis Men at increased risk for infection – talk with your healthcare pertussis (Td/Tdap) booster All men in this age group Td every 10 years, or a one-time dose of Tdap instead of a Td booster after age 25, then Td every 10 years   Counseling Who needs it How often   Diet and exercise Men who are overweight or obese When [facility]

## 2023-12-12 ENCOUNTER — OFFICE VISIT (OUTPATIENT)
Dept: FAMILY MEDICINE CLINIC | Facility: CLINIC | Age: 48
End: 2023-12-12
Payer: COMMERCIAL

## 2023-12-12 VITALS
TEMPERATURE: 98 F | WEIGHT: 232 LBS | DIASTOLIC BLOOD PRESSURE: 70 MMHG | BODY MASS INDEX: 32.48 KG/M2 | HEIGHT: 71 IN | SYSTOLIC BLOOD PRESSURE: 124 MMHG | RESPIRATION RATE: 99 BRPM | HEART RATE: 81 BPM

## 2023-12-12 DIAGNOSIS — J02.9 SORE THROAT: ICD-10-CM

## 2023-12-12 DIAGNOSIS — J02.0 STREP PHARYNGITIS: Primary | ICD-10-CM

## 2023-12-12 LAB
CONTROL LINE PRESENT WITH A CLEAR BACKGROUND (YES/NO): YES YES/NO
KIT LOT #: NORMAL NUMERIC
STREP GRP A CUL-SCR: POSITIVE

## 2023-12-12 PROCEDURE — 99213 OFFICE O/P EST LOW 20 MIN: CPT | Performed by: FAMILY MEDICINE

## 2023-12-12 PROCEDURE — 3008F BODY MASS INDEX DOCD: CPT | Performed by: FAMILY MEDICINE

## 2023-12-12 PROCEDURE — 87880 STREP A ASSAY W/OPTIC: CPT | Performed by: FAMILY MEDICINE

## 2023-12-12 PROCEDURE — 3074F SYST BP LT 130 MM HG: CPT | Performed by: FAMILY MEDICINE

## 2023-12-12 PROCEDURE — 3078F DIAST BP <80 MM HG: CPT | Performed by: FAMILY MEDICINE

## 2023-12-12 RX ORDER — PENICILLIN V POTASSIUM 500 MG/1
500 TABLET ORAL 2 TIMES DAILY
Qty: 20 TABLET | Refills: 0 | Status: SHIPPED | OUTPATIENT
Start: 2023-12-12 | End: 2023-12-22

## 2024-04-16 ENCOUNTER — TELEPHONE (OUTPATIENT)
Dept: FAMILY MEDICINE CLINIC | Facility: CLINIC | Age: 49
End: 2024-04-16

## 2024-04-16 NOTE — TELEPHONE ENCOUNTER
Recd request from [facility], [address], [address], [place] [address], with phone [phone] and fax [phone]. Request is for last office visit notes, lab results, imaging pertaining to kidneys, demographic, and insurance information. Faxed office visit notes from 12/12/23, labs from 1/25/23, and demo sheet to above fax number. No recent imaging available and no current insurance information.

## 2024-12-02 ENCOUNTER — TELEPHONE (OUTPATIENT)
Dept: FAMILY MEDICINE CLINIC | Facility: CLINIC | Age: 49
End: 2024-12-02

## 2024-12-02 NOTE — TELEPHONE ENCOUNTER
Received this appointment notification via [employer]:  Visit type: MYCHART PHYSICAL (0903)   12/31/2024 7:00 AM (40 minutes) with Trupti Coles in [facility] [address] [facility]      Patient comments:   Chest pains       Ok to keep as scheduled?

## 2024-12-02 NOTE — TELEPHONE ENCOUNTER
Spoke to patient. Patient states that he has intermittent chest pain for a couple of days. Patient thinks it is muscular related. He was moving Ellie boxes the last couple of days. Patient denies any current chest pain. No numbness, tingling, jaw pain. No lightheadedness or dizziness that is different from his baseline. Patient believes he has vertigo. Advised patient to schedule a sooner appointment to be further assessed. Patient agrees. Advised patient to seek immediate attention if his symptoms worsen and/or develops new symptoms. Patient verbalized understanding.    Future Appointments   Date Time Provider Department Center   12/4/2024  7:40 AM Trupti Coles MD [facility] [address]   12/31/2024  7:00 AM Trupti Coles MD [facility] [address] [facility] [address]

## 2024-12-04 ENCOUNTER — OFFICE VISIT (OUTPATIENT)
Dept: FAMILY MEDICINE CLINIC | Facility: CLINIC | Age: 49
End: 2024-12-04
Payer: COMMERCIAL

## 2024-12-04 VITALS
TEMPERATURE: 99 F | HEIGHT: 71 IN | OXYGEN SATURATION: 99 % | RESPIRATION RATE: 16 BRPM | WEIGHT: 240 LBS | SYSTOLIC BLOOD PRESSURE: 152 MMHG | DIASTOLIC BLOOD PRESSURE: 82 MMHG | BODY MASS INDEX: 33.6 KG/M2 | HEART RATE: 93 BPM

## 2024-12-04 DIAGNOSIS — R03.0 ELEVATED BLOOD PRESSURE READING: ICD-10-CM

## 2024-12-04 DIAGNOSIS — M94.0 COSTOCHONDRITIS: Primary | ICD-10-CM

## 2024-12-04 PROCEDURE — 99213 OFFICE O/P EST LOW 20 MIN: CPT | Performed by: FAMILY MEDICINE

## 2024-12-04 NOTE — PROGRESS NOTES
Generalized abd pain and vomiting that began this morning [facility] Visit Note  12/4/2024      Subjective:      Patient ID: Jose A Cantu Jr. is a 49 year old male.    Chief Complaint:  Chief Complaint   Patient presents with    Chest Pain     states he has intermittent chest pain for a couple of days. Patient thinks it is muscular related. He was moving Ellie boxes the last couple of days.       HPI:  Jose A Cantu Jr. is a 49 year old male who is being seen today for the above.      Intermittent chest pain- for a couple of days.  Pain is rated as a  1-2/10 sharp zingers and behind the sternum. Occurring 2 times/d and lasting a few seconds at a time. Patient thinks it is muscular related. He was moving Ellie boxes the last couple of days. Doesn't occur with working out.   Pgpa had a heart attack.      BP at home 120/70's. Is an anxious person. If does 6min of meditation it will go from 150/80's it will reduce back to normal. Mom said he was always an anxious kid.    Denies- n/v, diaphoresis, L jaw/neck/arm pain, sob, syncope      Review of Systems - as stated above in the HPI      Objective:     Vitals:    12/04/24 0736   BP: 152/82   BP Location: Right arm   Patient Position: Sitting   Cuff Size: adult   Pulse: 93   Resp: 16   Temp: 98.6 °F (37 °C)   TempSrc: Temporal   SpO2: 99%   Weight: 240 lb (108.9 kg)   Height: 5' 11\" (1.803 m)       Physical Examination   General:  Alert, in no acute distress  HEENT: NCAT, EOMI, mucus membranes moist   Neck:  No cervical lymphadenopathy, no thyromegaly  CV: Regular rate and rhythm. No murmurs, gallops, or rubs.  Lungs:  Clear to auscultation B, no wheezes, rales, or rhonchi, normal respiratory effort  Abd:  +bowel sounds, soft  Ext:  No pedal edema,  Pedal pulses 2+ B  MS: localizes discomfort to the L costochondral joint region. Non-tender to palpation at first, but after done says it feels a little achey there now      Assessment:     1. Costochondritis  -tylenol, heating pad prn  -if worsens or other  accompanying sxs occur to return to clinic or go to ER    2. Elevated blood pressure reading  -likely white coat HTN, to check at home and bring log and cuff to next appt this month      I am providing care as part of an ongoing, longitudinal care relationship.    Return for annual physical as scheduled (bring cuff to appt & log of BP).

## 2024-12-31 ENCOUNTER — LAB ENCOUNTER (OUTPATIENT)
Dept: LAB | Age: 49
End: 2024-12-31
Attending: FAMILY MEDICINE
Payer: COMMERCIAL

## 2024-12-31 ENCOUNTER — OFFICE VISIT (OUTPATIENT)
Dept: FAMILY MEDICINE CLINIC | Facility: CLINIC | Age: 49
End: 2024-12-31
Payer: COMMERCIAL

## 2024-12-31 VITALS
SYSTOLIC BLOOD PRESSURE: 150 MMHG | DIASTOLIC BLOOD PRESSURE: 88 MMHG | TEMPERATURE: 98 F | RESPIRATION RATE: 16 BRPM | HEIGHT: 71 IN | WEIGHT: 240 LBS | BODY MASS INDEX: 33.6 KG/M2 | HEART RATE: 82 BPM | OXYGEN SATURATION: 99 %

## 2024-12-31 DIAGNOSIS — E78.2 MIXED HYPERLIPIDEMIA: ICD-10-CM

## 2024-12-31 DIAGNOSIS — R76.8 POSITIVE ANA (ANTINUCLEAR ANTIBODY): ICD-10-CM

## 2024-12-31 DIAGNOSIS — Z00.00 LABORATORY EXAM ORDERED AS PART OF ROUTINE GENERAL MEDICAL EXAMINATION: ICD-10-CM

## 2024-12-31 DIAGNOSIS — R73.09 ELEVATED GLUCOSE: ICD-10-CM

## 2024-12-31 DIAGNOSIS — M79.676 PAIN OF TOE, UNSPECIFIED LATERALITY: ICD-10-CM

## 2024-12-31 DIAGNOSIS — Z00.00 ROUTINE MEDICAL EXAM: Primary | ICD-10-CM

## 2024-12-31 DIAGNOSIS — R03.0 WHITE COAT SYNDROME WITHOUT DIAGNOSIS OF HYPERTENSION: ICD-10-CM

## 2024-12-31 LAB
ALBUMIN SERPL-MCNC: 4.6 G/DL (ref 3.2–4.8)
ALBUMIN/GLOB SERPL: 1.3 {RATIO} (ref 1–2)
ALP LIVER SERPL-CCNC: 68 U/L
ALT SERPL-CCNC: 63 U/L
ANION GAP SERPL CALC-SCNC: 11 MMOL/L (ref 0–18)
AST SERPL-CCNC: 52 U/L (ref ?–34)
BASOPHILS # BLD AUTO: 0.04 X10(3) UL (ref 0–0.2)
BASOPHILS NFR BLD AUTO: 0.6 %
BILIRUB SERPL-MCNC: 0.8 MG/DL (ref 0.3–1.2)
BUN BLD-MCNC: 11 MG/DL (ref 9–23)
CALCIUM BLD-MCNC: 9.9 MG/DL (ref 8.7–10.4)
CHLORIDE SERPL-SCNC: 98 MMOL/L (ref 98–112)
CHOLEST SERPL-MCNC: 246 MG/DL (ref ?–200)
CO2 SERPL-SCNC: 27 MMOL/L (ref 21–32)
CREAT BLD-MCNC: 0.97 MG/DL
EGFRCR SERPLBLD CKD-EPI 2021: 96 ML/MIN/1.73M2 (ref 60–?)
EOSINOPHIL # BLD AUTO: 0.22 X10(3) UL (ref 0–0.7)
EOSINOPHIL NFR BLD AUTO: 3 %
ERYTHROCYTE [DISTWIDTH] IN BLOOD BY AUTOMATED COUNT: 11.4 %
EST. AVERAGE GLUCOSE BLD GHB EST-MCNC: 177 MG/DL (ref 68–126)
FASTING PATIENT LIPID ANSWER: YES
FASTING STATUS PATIENT QL REPORTED: YES
GLOBULIN PLAS-MCNC: 3.5 G/DL (ref 2–3.5)
GLUCOSE BLD-MCNC: 190 MG/DL (ref 70–99)
HBA1C MFR BLD: 7.8 % (ref ?–5.7)
HCT VFR BLD AUTO: 42.5 %
HDLC SERPL-MCNC: 37 MG/DL (ref 40–59)
HGB BLD-MCNC: 15.3 G/DL
IMM GRANULOCYTES # BLD AUTO: 0.02 X10(3) UL (ref 0–1)
IMM GRANULOCYTES NFR BLD: 0.3 %
LDLC SERPL CALC-MCNC: 95 MG/DL (ref ?–100)
LYMPHOCYTES # BLD AUTO: 1.98 X10(3) UL (ref 1–4)
LYMPHOCYTES NFR BLD AUTO: 27.3 %
MCH RBC QN AUTO: 32.1 PG (ref 26–34)
MCHC RBC AUTO-ENTMCNC: 36 G/DL (ref 31–37)
MCV RBC AUTO: 89.1 FL
MONOCYTES # BLD AUTO: 0.58 X10(3) UL (ref 0.1–1)
MONOCYTES NFR BLD AUTO: 8 %
NEUTROPHILS # BLD AUTO: 4.41 X10 (3) UL (ref 1.5–7.7)
NEUTROPHILS # BLD AUTO: 4.41 X10(3) UL (ref 1.5–7.7)
NEUTROPHILS NFR BLD AUTO: 60.8 %
NONHDLC SERPL-MCNC: 209 MG/DL (ref ?–130)
OSMOLALITY SERPL CALC.SUM OF ELEC: 286 MOSM/KG (ref 275–295)
PLATELET # BLD AUTO: 192 10(3)UL (ref 150–450)
POTASSIUM SERPL-SCNC: 3.8 MMOL/L (ref 3.5–5.1)
PROT SERPL-MCNC: 8.1 G/DL (ref 5.7–8.2)
RBC # BLD AUTO: 4.77 X10(6)UL
SODIUM SERPL-SCNC: 136 MMOL/L (ref 136–145)
TRIGL SERPL-MCNC: 678 MG/DL (ref 30–149)
URATE SERPL-MCNC: 7.1 MG/DL
VLDLC SERPL CALC-MCNC: 117 MG/DL (ref 0–30)
WBC # BLD AUTO: 7.3 X10(3) UL (ref 4–11)

## 2024-12-31 PROCEDURE — 83036 HEMOGLOBIN GLYCOSYLATED A1C: CPT | Performed by: FAMILY MEDICINE

## 2024-12-31 PROCEDURE — 84550 ASSAY OF BLOOD/URIC ACID: CPT | Performed by: FAMILY MEDICINE

## 2024-12-31 PROCEDURE — 80053 COMPREHEN METABOLIC PANEL: CPT | Performed by: FAMILY MEDICINE

## 2024-12-31 PROCEDURE — 36415 COLL VENOUS BLD VENIPUNCTURE: CPT | Performed by: FAMILY MEDICINE

## 2024-12-31 PROCEDURE — 80061 LIPID PANEL: CPT | Performed by: FAMILY MEDICINE

## 2024-12-31 PROCEDURE — 85025 COMPLETE CBC W/AUTO DIFF WBC: CPT | Performed by: FAMILY MEDICINE

## 2024-12-31 PROCEDURE — 99396 PREV VISIT EST AGE 40-64: CPT | Performed by: FAMILY MEDICINE

## 2024-12-31 NOTE — PROGRESS NOTES
Chief Complaint   Patient presents with    Physical     Reviewed Preventative/Wellness form with patient.         HPI:  Jose A Cantu Jr. is a 49 year old male here today for preventative visit.      Imms- up-to-date with covid, flu & Td.       Prostate cancer screening- No known early family h/o prostate cancer. Nocturia 0x/night.     Denies- hesitancy, urinary frequency, dribbling        Colon cancer screening- No family h/o colon cancer. Cologuard neg on 3/23/23, repeat 3 yrs.        Diet/exercise- was at 205 with running everyday 5/23, but gained 35# over the last 1.5yr and now at 240#. Starting a carnivore diet.         Dental/Eye Check up-  Recommended pt see dentist once every 6 months for a cleaning and once every year for an eye exam.        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 50mg made him dizzy. Says it is only high here when nervous.    Home readings: 118-144/74-84, avg 130/70's  In the office his readings are high with our cuff and his home cuff which is accurate.               Elevated glucose- Last Hba1c was 9.5 on 10/14/22, then 5.7 on 1/25/23. Not taking anything. Fasting sugar .   Lost 40# since 10/22 in the last 3 months.   Drinking 1 gal water/d  Eating only fish/chicken, and veggies, no soda  Exercising 6d/wk and most often twice a day.   Sleeping better  -+/- ASA  -+/- ACE-I/ARB  -LDL   -urine microalbumin (ab/normal)-   -daily foot exam discussion-   -monofilament (ab/normal)-   -diabetic eye exam-         High lft's- in past has been told he has a fatty liver, but says didn't have specific labs to r/o other conditions. LFT's last time were normal. Also had pain of a great toe, but uric aci never done yet. Toe no longer painful and not since then either. 1/25/23 labs showed +CONNIE and was referred to Dr. Hung and he ordered labs but pt never did them and now  .   Dense fine speckled pattern is seen in normal   individuals and rarely associated with systemic lupus   erythematosis (SLE), Sjogren's syndrome and systemic   sclerosis.      CT Abd/pelvis 20 support fatty liver.        Past Medical History:    Migraines    Mixed hyperlipidemia    diet controlled    Positive CONNIE (antinuclear antibody)    Nuclear, Dense Fine Speckled Abnormal  1:320    White coat syndrome without diagnosis of hypertension     Past Surgical History:   Procedure Laterality Date    [facility] amb cologuard (results only)  2023    neg, repeat 3 yrs     Medications Ordered Prior to Encounter[1]  Allergies[2]  Social History     Socioeconomic History    Marital status:      Spouse name: Not on file    Number of children: Not on file    Years of education: Not on file    Highest education level: Not on file   Occupational History    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   Tobacco Use    Smoking status: Never    Smokeless tobacco: Never   Vaping Use    Vaping status: Never Used   Substance and Sexual Activity    Alcohol use: No     Comment: never a problem    Drug use: No    Sexual activity: Yes     Partners: Female     Comment: wife in menopause   Other Topics Concern     Service Not Asked    Blood Transfusions Not Asked    Caffeine Concern No     Comment: rare    Occupational Exposure No    Hobby Hazards Not Asked    Sleep Concern Yes     Comment: light sleeper    Stress Concern Yes     Comment: work and family    Weight Concern No     Comment: loses and gains easily    Special Diet Not Asked    Back Care Not Asked    Exercise Yes     Comment: not much formal    Bike Helmet Not Asked    Seat Belt No    Self-Exams No   Social History Narrative    Not on file     Social Drivers of Health     Financial Resource Strain: Not on file   Food Insecurity: Not on file   Transportation Needs: Not on file   Physical Activity: Not on file   Stress: Not on file   Social Connections: Not on  file   Housing Stability: Not on file     Family History   Problem Relation Age of Onset    Diabetes Mother     High Blood Pressure Mother     Other (lung cancer) Father 53        smoker 2ppd    No Known Problems Sister     Diabetes Maternal Grandmother     High Blood Pressure Maternal Grandmother     Heart Disease Maternal Grandmother     Diabetes Maternal Grandfather     No Known Problems Paternal Grandmother     Heart Attack Paternal Grandfather        Review of Systems - All systems reviewed and negative except for HPI    PHYSICAL EXAM:  /88 (BP Location: Right arm, Patient Position: Sitting, Cuff Size: large)   Pulse 82   Temp 97.9 °F (36.6 °C) (Temporal)   Resp 16   Ht 5' 11\" (1.803 m)   Wt 240 lb (108.9 kg)   SpO2 99%   BMI 33.47 kg/m²     Home cuff- 164/99    GENERAL APPEARANCE:  Alert, no acute distress, appears stated age  HEENT:  Head- Normocephalic, atraumatic.    Eyes- Extraocular movements intact, pupils equally round and reactive to light,  conjunctivae normal.    Ears- Tympanic membranes intact bilaterally.    Nose- Patent, normal septum and turbinates.    Mouth/Throat- Normal oral mucosa, throat non-erythematous.  NECK:  No submental, submandibular, ant/post cervical lymphadenopathy. No thyromegaly or masses.  PULMONARY:  Lungs clear to auscultation bilaterally. No wheezes, rales, or rhonchi. Normal respiratory effort.  CARDIOVASCULAR:  Regular rate and rhythm. No murmurs, gallops, or rubs.  ABDOMEN:  + bowel sounds, soft, nontender, nondistended. No hepatomegaly.  MUSCULOSKELETAL: Strength of upper and lower extremities 5/5 bilaterally. Normal gait.  NEUROLOGIC:  Cranial nerves 2-12 grossly intact.  PSYCHIATRIC:  Normal mood, affect, and hygiene.     ASSESSMENT/PLAN:    1. Routine medical exam    2. Laboratory exam ordered as part of routine general medical examination  - CBC [6399] [Q]  - COMP METABOLIC PANEL [98710] [Q]  - LIPID PANEL [7600] [Q]    3. Mixed hyperlipidemia  - COMP  METABOLIC PANEL [40973] [Q]  - LIPID PANEL [7600] [Q]    4. White coat syndrome without diagnosis of hypertension  -aware, to bring record of readings to each visit.   -monitor  -will work on diet/exercise    5. Elevated glucose  - HGB A1C [496] [Q]    6. Pain of toe, unspecified laterality  - Uric Acid [905][Q]    7. Positive CONNIE (antinuclear antibody)  - Uric Acid [905][Q]        Patient verbalized understanding and agrees to plan.      Return in about 1 year (around 12/31/2025) for annual physical, we will contact you with results.         [1]   Current Outpatient Medications on File Prior to Visit   Medication Sig Dispense Refill    MAGNESIUM OR Take 1 tablet by mouth daily.      Multiple Vitamin (MULTI-VITAMIN OR) Take 1 tablet by mouth daily.         No current facility-administered medications on file prior to visit.   [2] No Known Allergies

## 2025-05-27 ENCOUNTER — TELEPHONE (OUTPATIENT)
Dept: FAMILY MEDICINE CLINIC | Facility: CLINIC | Age: 50
End: 2025-05-27

## 2025-05-27 DIAGNOSIS — Z11.1 SCREENING-PULMONARY TB: Primary | ICD-10-CM

## (undated) NOTE — LETTER
Date: 9/21/2018    Patient Name: Jaime Awan. To Whom it may concern:     This letter has been written at the patient's request. The above patient was seen at the [facility] and received his influenza vaccination today      Dolores  45.

## (undated) NOTE — Clinical Note
02/20/2017        Jose A Cantu Jr.   [address] [facility] [facility] [address] [facility]      Dear Arsen Glass records indicate that you have outstanding lab work and or testing that was ordered for you and has not yet been completed:      Lipid Panel [E]  Comp M